# Patient Record
Sex: MALE | Race: BLACK OR AFRICAN AMERICAN | URBAN - METROPOLITAN AREA
[De-identification: names, ages, dates, MRNs, and addresses within clinical notes are randomized per-mention and may not be internally consistent; named-entity substitution may affect disease eponyms.]

---

## 2018-08-27 ENCOUNTER — OFFICE VISIT (OUTPATIENT)
Dept: INTERNAL MEDICINE CLINIC | Age: 68
End: 2018-08-27

## 2018-08-27 VITALS
TEMPERATURE: 98.7 F | HEART RATE: 92 BPM | OXYGEN SATURATION: 98 % | BODY MASS INDEX: 22.2 KG/M2 | RESPIRATION RATE: 18 BRPM | DIASTOLIC BLOOD PRESSURE: 83 MMHG | SYSTOLIC BLOOD PRESSURE: 117 MMHG | HEIGHT: 64 IN | WEIGHT: 130 LBS

## 2018-08-27 DIAGNOSIS — M54.2 CHRONIC NECK PAIN: ICD-10-CM

## 2018-08-27 DIAGNOSIS — J43.8 OTHER EMPHYSEMA (HCC): ICD-10-CM

## 2018-08-27 DIAGNOSIS — G89.29 CHRONIC NECK PAIN: ICD-10-CM

## 2018-08-27 DIAGNOSIS — M50.30 DEGENERATIVE DISC DISEASE, CERVICAL: ICD-10-CM

## 2018-08-27 DIAGNOSIS — K40.90 LEFT INGUINAL HERNIA: Primary | ICD-10-CM

## 2018-08-27 PROBLEM — E11.9 TYPE 2 DIABETES MELLITUS, WITHOUT LONG-TERM CURRENT USE OF INSULIN (HCC): Status: ACTIVE | Noted: 2018-08-27

## 2018-08-27 RX ORDER — FLUTICASONE PROPIONATE AND SALMETEROL 250; 50 UG/1; UG/1
1 POWDER RESPIRATORY (INHALATION) 2 TIMES DAILY
Qty: 1 INHALER | Refills: 5 | Status: SHIPPED | OUTPATIENT
Start: 2018-08-27 | End: 2018-12-02 | Stop reason: SDUPTHER

## 2018-08-27 RX ORDER — IPRATROPIUM BROMIDE AND ALBUTEROL SULFATE 2.5; .5 MG/3ML; MG/3ML
3 SOLUTION RESPIRATORY (INHALATION)
Qty: 30 NEBULE | Refills: 5 | Status: SHIPPED | OUTPATIENT
Start: 2018-08-27 | End: 2019-04-17 | Stop reason: SDUPTHER

## 2018-08-27 NOTE — PROGRESS NOTES
HISTORY OF PRESENT ILLNESS  Luke Anaya is a 78 y.o. male. HPI Mr. Mj Mistry is here to re-establish care and for concerned about LLQ pain for the past several months. He actually has been seen at the ER and was advised to follow up with a surgeon. He believes the hernia has increased in size and is uncomfortable. He has been out of several medications. 2018 June  CT ABD/PELVIS-IV ONLY6/5/2018  [facility]  Result Impression   IMPRESSION:    Small left inguinal hernia which contains nondilated small bowel loop. No bowel obstruction or bowel inflammation seen. A normal appendix. He also has been seeing pain management for chronic low back pain. He is also having neck pain and is supposed to have an MRI done. His sister is asking if I can just order the MRI of his neck b/c she has to take someone else to get an MRI and wants to schedule them at the same time. His pain management doctor is Dr. Charleen Vazquez. [facility] [address]  Result Impression   Impression:    1.  No acute bony abnormality.  No significant change from prior CT with moderately severe cervical spondylosis with bilateral areas of significant foraminal narrowing as described above. He has multiple other health issues - emphysema, DM2. His sister said his previous PCP retired. Advised him he needs to come in to address and treat his other medical conditions as soon as possible. Review of Systems   Constitutional: Negative. Eyes: Negative. Respiratory: Positive for cough, shortness of breath and wheezing. Cardiovascular: Negative for chest pain and leg swelling. Musculoskeletal: Positive for back pain and neck pain. Neurological: Negative. Physical Exam   Constitutional: He is oriented to person, place, and time. He appears well-developed and well-nourished. No distress. HENT:   Head: Normocephalic and atraumatic. Cardiovascular: Normal rate and regular rhythm. Pulmonary/Chest: He has wheezes. He has rales. Abdominal: A hernia is present. Hernia confirmed positive in the left inguinal area. Neurological: He is alert and oriented to person, place, and time. Visit Vitals    /83 (BP 1 Location: Left arm, BP Patient Position: Sitting)    Pulse 92    Temp 98.7 °F (37.1 °C) (Oral)    Resp 18    Ht 5' 4\" (1.626 m)    Wt 130 lb (59 kg)    SpO2 98%    BMI 22.31 kg/m2       ASSESSMENT and PLAN    ICD-10-CM ICD-9-CM    1. Left inguinal hernia K40.90 550.90 REFERRAL TO GENERAL SURGERY   2. Chronic neck pain M54.2 723.1 MRI CERV SPINE WO CONT    G89.29 338.29    3. Degenerative disc disease, cervical M50.30 722.4 MRI CERV SPINE WO CONT   4. Other emphysema ([facility] [address] J43.8 492.8 albuterol-ipratropium (DUO-NEB) 2.5 mg-0.5 mg/3 ml nebu      fluticasone-salmeterol (ADVAIR DISKUS) 250-50 mcg/dose diskus inhaler     Pt verbalized understanding of their condition and diagnoses, treatment plan,  as well as side effects of any new medications prescribed.

## 2018-08-27 NOTE — PROGRESS NOTES
Chief Complaint   Patient presents with    Abdominal Pain     knot on left side of abdomen     Neck Pain     pt seeing pain management who wants him to have an MRI before being seen or giving injections     Wheezing     and cough      1. Have you been to the ER, urgent care clinic since your last visit? Hospitalized since your last visit? No    2. Have you seen or consulted any other health care providers outside of the [address] [place] [address] since your last visit? Include any pap smears or colon screening.  No

## 2018-08-29 DIAGNOSIS — G89.29 CHRONIC NECK PAIN: ICD-10-CM

## 2018-08-29 DIAGNOSIS — M54.2 CHRONIC NECK PAIN: ICD-10-CM

## 2018-08-29 DIAGNOSIS — M50.30 DEGENERATIVE DISC DISEASE, CERVICAL: ICD-10-CM

## 2018-09-20 LAB — HBA1C MFR BLD HPLC: 5.9 %

## 2018-09-21 ENCOUNTER — OFFICE VISIT (OUTPATIENT)
Dept: INTERNAL MEDICINE CLINIC | Age: 68
End: 2018-09-21

## 2018-09-21 ENCOUNTER — HOSPITAL ENCOUNTER (OUTPATIENT)
Dept: LAB | Age: 68
Discharge: HOME | End: 2018-09-21
Payer: MEDICARE

## 2018-09-21 VITALS
OXYGEN SATURATION: 99 % | HEART RATE: 60 BPM | DIASTOLIC BLOOD PRESSURE: 80 MMHG | WEIGHT: 130 LBS | SYSTOLIC BLOOD PRESSURE: 122 MMHG | RESPIRATION RATE: 18 BRPM | BODY MASS INDEX: 22.2 KG/M2 | HEIGHT: 64 IN | TEMPERATURE: 98 F

## 2018-09-21 DIAGNOSIS — R82.998 LEUKOCYTES IN URINE: ICD-10-CM

## 2018-09-21 DIAGNOSIS — R94.31 ABNORMAL EKG: ICD-10-CM

## 2018-09-21 DIAGNOSIS — K40.90 LEFT INGUINAL HERNIA: Primary | ICD-10-CM

## 2018-09-21 DIAGNOSIS — E11.9 TYPE 2 DIABETES MELLITUS WITHOUT COMPLICATION, WITHOUT LONG-TERM CURRENT USE OF INSULIN (HCC): ICD-10-CM

## 2018-09-21 DIAGNOSIS — L85.3 DRY SKIN DERMATITIS: ICD-10-CM

## 2018-09-21 DIAGNOSIS — Z23 ENCOUNTER FOR IMMUNIZATION: ICD-10-CM

## 2018-09-21 DIAGNOSIS — Z01.818 PRE-OP EXAM: ICD-10-CM

## 2018-09-21 DIAGNOSIS — Z00.00 INITIAL MEDICARE ANNUAL WELLNESS VISIT: ICD-10-CM

## 2018-09-21 DIAGNOSIS — Z12.5 SCREENING FOR MALIGNANT NEOPLASM OF PROSTATE: ICD-10-CM

## 2018-09-21 PROCEDURE — 87491 CHLMYD TRACH DNA AMP PROBE: CPT | Performed by: PHYSICIAN ASSISTANT

## 2018-09-21 PROCEDURE — 87086 URINE CULTURE/COLONY COUNT: CPT | Performed by: PHYSICIAN ASSISTANT

## 2018-09-21 RX ORDER — AMMONIUM LACTATE 12 G/100G
LOTION TOPICAL
Qty: 1 BOTTLE | Refills: 3 | Status: SHIPPED | OUTPATIENT
Start: 2018-09-21

## 2018-09-21 NOTE — PROGRESS NOTES
Chief Complaint Patient presents with  Pre-op Exam  
 
1. Have you been to the ER, urgent care clinic since your last visit? Hospitalized since your last visit? No 
 
2. Have you seen or consulted any other health care providers outside of the [address] [place] [address] since your last visit? Include any pap smears or colon screening. No 
 
[facility] Oct 1 at 3:00pm 
[address] 
2[address] [facility] at 2:30pm  
[phone] ADL Assessment 9/21/2018 Feeding yourself No Help Needed Getting from bed to chair No Help Needed Getting dressed No Help Needed Bathing or showering No Help Needed Walk across the room (includes cane/walker) No Help Needed Using the telphone No Help Needed Taking your medications No Help Needed Preparing meals No Help Needed Managing money (expenses/bills) No Help Needed Moderately strenuous housework (laundry) No Help Needed Shopping for personal items (toiletries/medicines) No Help Needed Shopping for groceries No Help Needed Driving Help Needed Climbing a flight of stairs No Help Needed Getting to places beyond walking distances Help Needed ACP given to pt

## 2018-09-21 NOTE — PATIENT INSTRUCTIONS
[facility] Oct 1 at 3:00pm 
[address] 
2[address] [facility] Arrive at 2:30pm  
[phone]

## 2018-09-21 NOTE — MR AVS SNAPSHOT
[address] [place] [address] 84 [address] [id number] 
[phone] Patient: Carmen Christianson MRN: [id number] :1950 Visit Information Date & Time Provider Department Dept. Phone Encounter #  
 2018 11:30 AM Margot Stewart PA-C [facility] Higgins  Upcoming Health Maintenance Date Due  
 EYE EXAM RETINAL OR DILATED Q1 1961 FOBT Q 1 YEAR AGE 50-75 2001 GLAUCOMA SCREENING Q2Y 2016 Influenza Age 5 to Adult 3/31/2019* ZOSTER VACCINE AGE 60> 2019* Pneumococcal 65+ Low/Medium Risk (1 of 2 - PCV13) 2019* DTaP/Tdap/Td series (1 - Tdap) 2020* HEMOGLOBIN A1C Q6M 3/20/2019 FOOT EXAM Q1 2019 MICROALBUMIN Q1 2019 LIPID PANEL Q1 2019 MEDICARE YEARLY EXAM 2019 *Topic was postponed. The date shown is not the original due date. Allergies as of 2018  Review Complete On: 2018 By: Annalisa Arnold No Known Allergies Current Immunizations  Never Reviewed No immunizations on file. Not reviewed this visit You Were Diagnosed With   
  
 Codes Comments Leukocytes in urine    -  Primary ICD-10-CM: R82.99 
ICD-9-CM: 791.7 Encounter for immunization     ICD-10-CM: W29 ICD-9-CM: V03.89 Type 2 diabetes mellitus without complication, without long-term current use of insulin (HCC)     ICD-10-CM: E11.9 ICD-9-CM: 250.00 Screening for malignant neoplasm of prostate     ICD-10-CM: Z12.5 ICD-9-CM: V76.44 Vitals BP Pulse Temp Resp Height(growth percentile) Weight(growth percentile) 122/80 (BP 1 Location: Left arm, BP Patient Position: Sitting) 60 98 °F (36.7 °C) (Oral) 18 5' 4\" (1.626 m) 130 lb (59 kg) SpO2 BMI Smoking Status 99% 22.31 kg/m2 Current Every Day Smoker Vitals History BMI and BSA Data  Body Mass Index Body Surface Area  
 22.31 kg/m 2 1.63 m 2  
  
  
 Preferred Pharmacy Pharmacy Name Phone [phone] Your Updated Medication List  
  
   
This list is accurate as of 9/21/18 11:58 AM.  Always use your most recent med list.  
  
  
  
  
 * VENTOLIN HFA 90 mcg/actuation inhaler Generic drug:  albuterol INHALE 1-2 PUFFS BY MOUTH EVERY 4 HOURS AS NEEDED FOR WHEEZING OR SHORTNESS OF BREATH  
  
 * albuterol 2.5 mg /3 mL (0.083 %) nebulizer solution Commonly known as:  PROVENTIL VENTOLIN  
INHALE 1 VIAL (3ML) VIA NEBULIZER TWICE DAILY  
  
 albuterol-ipratropium 2.5 mg-0.5 mg/3 ml Nebu Commonly known as:  DUO-NEB  
3 mL by Nebulization route every four (4) hours as needed. fluticasone-salmeterol 250-50 mcg/dose diskus inhaler Commonly known as:  ADVAIR DISKUS Take 1 Puff by inhalation two (2) times a day.  
  
 gabapentin 100 mg capsule Commonly known as:  NEURONTIN  
100 mg.  
  
 metFORMIN 1,000 mg tablet Commonly known as:  GLUCOPHAGE  
TAKE 1 TABLET BY MOUTH TWICE DAILY  
  
 ONETOUCH ULTRA TEST strip Generic drug:  glucose blood VI test strips 50 Each.  
  
 simvastatin 20 mg tablet Commonly known as:  ZOCOR  
TAKE 1 TABLET BY MOUTH ONCE DAILY * Notice: This list has 2 medication(s) that are the same as other medications prescribed for you. Read the directions carefully, and ask your doctor or other care provider to review them with you. We Performed the Following AMB EXT HGBA1C [AQH08817 CPT(R)] Comments: This external order was created through the Results Console. Patient Instructions [facility] Oct 1 at 3:00pm 
[address] 
[address] [facility] at 2:30pm  
[phone] Introducing [facility] & HEALTH SERVICES!    
 [employer] introduces [employer] patient portal. Now you can access parts of your medical record, email your doctor's office, and request medication refills online. 1. In your internet browser, go to [email or web address] 2. Click on the First Time User? Click Here link in the Sign In box. You will see the New Member Sign Up page. 3. Enter your [employer] Access Code exactly as it appears below. You will not need to use this code after youve completed the sign-up process. If you do not sign up before the expiration date, you must request a new code. · [employer] Access Code: [email or web address] Expires: 11/26/2018  2:45 PM 
 
4. Enter the last four digits of your Social Security Number (xxxx) and Date of Birth (mm/dd/yyyy) as indicated and click Submit. You will be taken to the next sign-up page. 5. Create a [employer] ID. This will be your [employer] login ID and cannot be changed, so think of one that is secure and easy to remember. 6. Create a [employer] password. You can change your password at any time. 7. Enter your Password Reset Question and Answer. This can be used at a later time if you forget your password. 8. Enter your e-mail address. You will receive e-mail notification when new information is available in [address] 9. Click Sign Up. You can now view and download portions of your medical record. 10. Click the Download Summary menu link to download a portable copy of your medical information. If you have questions, please visit the Frequently Asked Questions section of the [employer] website. Remember, [employer] is NOT to be used for urgent needs. For medical emergencies, dial 911. Now available from your iPhone and Android! Please provide this summary of care documentation to your next provider. If you have any questions after today's visit, please call [phone].

## 2018-09-24 ENCOUNTER — TELEPHONE (OUTPATIENT)
Dept: INTERNAL MEDICINE CLINIC | Age: 68
End: 2018-09-24

## 2018-09-24 DIAGNOSIS — R79.89 LOW TSH LEVEL: Primary | ICD-10-CM

## 2018-09-24 LAB
CHOLEST SERPL-MCNC: 196 MG/DL (ref 100–199)
CREATININE(CRT),U, 723280: 200 MG/DL
HDLC SERPL-MCNC: 64 MG/DL
LDLC SERPL CALC-MCNC: 122 MG/DL (ref 0–99)
MICROALBUMIN UR TEST STR-MCNC: 30 MG/L
MICROALBUMIN/CREAT RATIO POC: <30 MG/G
PSA SERPL-MCNC: 3.3 NG/ML (ref 0–4)
TRIGL SERPL-MCNC: 51 MG/DL (ref 0–149)
TSH SERPL DL<=0.005 MIU/L-ACNC: 0.12 UIU/ML (ref 0.45–4.5)
VLDLC SERPL CALC-MCNC: 10 MG/DL (ref 5–40)

## 2018-09-24 NOTE — TELEPHONE ENCOUNTER
His TSH is low again. I am going to refer him to endocrinology. His bad cholesterol is a little high. Make sure he is taking simvastatin daily and if it remains elevated, I will increase the dose.

## 2018-09-26 LAB
BACTERIA SPEC CULT: NORMAL
C TRACH RRNA VAG QL NAA+PROBE: NEGATIVE
N GONORRHOEA RRNA VAG QL NAA+PROBE: NEGATIVE
SERVICE CMNT-IMP: NORMAL
SPECIMEN SOURCE: ABNORMAL
T VAGINALIS RRNA VAG QL NAA+PROBE: POSITIVE

## 2018-09-26 NOTE — PROGRESS NOTES
Ambreen Martinez is a 78 y.o. male and presents for annual Medicare Wellness Visit. Problem List: Reviewed with patient and discussed risk factors. Patient Active Problem List  
Diagnosis Code  Left inguinal hernia K40.90  Type 2 diabetes mellitus, without long-term current use of insulin (HCC) E11.9  Chronic neck pain M54.2, G89.29  
 Other emphysema (HCC) J43.8  Low TSH level R94.6 Current medical providers:  No care team member to display PSH: Reviewed with patient History reviewed. No pertinent surgical history. SH: Reviewed with patient Social History Substance Use Topics  Smoking status: Current Every Day Smoker Packs/day: 0.25 Years: 40.00  Smokeless tobacco: Never Used  Alcohol use No  
 
 
FH: Reviewed with patient Family History Problem Relation Age of Onset  Diabetes Mother  Diabetes Brother Medications/Allergies: Reviewed with patient Current Outpatient Prescriptions on File Prior to Visit Medication Sig Dispense Refill  albuterol-ipratropium (DUO-NEB) 2.5 mg-0.5 mg/3 ml nebu 3 mL by Nebulization route every four (4) hours as needed. 30 Nebule 5  
 fluticasone-salmeterol (ADVAIR DISKUS) 250-50 mcg/dose diskus inhaler Take 1 Puff by inhalation two (2) times a day. 1 Inhaler 5  
 albuterol (PROVENTIL VENTOLIN) 2.5 mg /3 mL (0.083 %) nebulizer solution INHALE 1 VIAL (3ML) VIA NEBULIZER TWICE DAILY  2  
 VENTOLIN HFA 90 mcg/actuation inhaler INHALE 1-2 PUFFS BY MOUTH EVERY 4 HOURS AS NEEDED FOR WHEEZING OR SHORTNESS OF BREATH  0  
 metFORMIN (GLUCOPHAGE) 1,000 mg tablet TAKE 1 TABLET BY MOUTH TWICE DAILY  5  
 simvastatin (ZOCOR) 20 mg tablet TAKE 1 TABLET BY MOUTH ONCE DAILY  5  
 glucose blood VI test strips (ONETOUCH ULTRA TEST) strip 50 Each.  gabapentin (NEURONTIN) 100 mg capsule 100 mg. No current facility-administered medications on file prior to visit. No Known Allergies Objective: 
Visit Vitals  /80 (BP 1 Location: Left arm, BP Patient Position: Sitting)  Pulse 60  Temp 98 °F (36.7 °C) (Oral)  Resp 18  Ht 5' 4\" (1.626 m)  Wt 130 lb (59 kg)  SpO2 99%  BMI 22.31 kg/m2 Body mass index is 22.31 kg/(m^2). Assessment of cognitive impairment: Alert and oriented x 3 Depression Screen: PHQ over the last two weeks 9/21/2018 Little interest or pleasure in doing things Not at all Feeling down, depressed, irritable, or hopeless Not at all Total Score PHQ 2 0 Fall Risk Assessment:  No flowsheet data found. Functional Ability:  
Does the patient exhibit a steady gait? yes How long did it take the patient to get up and walk from a sitting position? <10 seconds Is the patient self reliant?  (ie can do own laundry, meals, household chores)  Yes - aside from driving Does the patient handle his/her own medications? yes Does the patient handle his/her own money? yes Is the patients home safe (ie good lighting, handrails on stairs and bath, etc.)? yes Did you notice or did patient express any hearing difficulties? no 
  
Did you notice or did patient express any vision difficulties?   no 
  
Were distance and reading eye charts used? yes Advance Care Planning:  
Patient was offered the opportunity to discuss advance care planning:  yes Does patient have an Advance Directive:  no If no, did you provide information on Caring Connections? yes Plan:   
 
Orders Placed This Encounter  CULTURE, URINE  AMB EXT HGBA1C  
 LIPID PANEL  
 TSH 3RD GENERATION  
 PSA SCREENING (SCREENING)  CT/NG/T.VAGINALIS AMPLIFICATION  
 LIPID PANEL  
 TSH 3RD GENERATION  
 PSA SCREENING (SCREENING)  REFERRAL TO OPHTHALMOLOGY  REFERRAL TO PODIATRY  AMB POC URINE, MICROALBUMIN, SEMIQUANTITATIVE  
 HM DIABETES FOOT EXAM  
 ammonium lactate (LAC-HYDRIN) 12 % lotion Health Maintenance Topic Date Due  
  EYE EXAM RETINAL OR DILATED Q1  12/28/1961  Shingrix Vaccine Age 50> (1 of 2) 12/28/2001  
 FOBT Q 1 YEAR AGE 50-75  12/28/2001  GLAUCOMA SCREENING Q2Y  12/28/2016  Influenza Age 5 to Adult  03/31/2019 (Originally 8/1/2018)  Pneumococcal 65+ Low/Medium Risk (1 of 2 - PCV13) 09/21/2019 (Originally 12/28/2016)  DTaP/Tdap/Td series (1 - Tdap) 01/01/2020 (Originally 12/28/1972)  HEMOGLOBIN A1C Q6M  03/20/2019  
 FOOT EXAM Q1  09/21/2019  LIPID PANEL Q1  09/21/2019  MEDICARE YEARLY EXAM  09/22/2019  MICROALBUMIN Q1  09/24/2019  Hepatitis C Screening  Completed *Patient verbalized understanding and agreement with the plan. A copy of the After Visit Summary with personalized health plan was given to the patient today.

## 2018-09-27 ENCOUNTER — TELEPHONE (OUTPATIENT)
Dept: INTERNAL MEDICINE CLINIC | Age: 68
End: 2018-09-27

## 2018-09-27 NOTE — TELEPHONE ENCOUNTER
His urine was positive for trichomonas. He did not have gonorrhea, chlamydia or other infection. He may not have sx of trichomonas, but if he is sexually active he could pass it on to a partner in which case I would advise treating it.

## 2018-09-28 RX ORDER — METRONIDAZOLE 500 MG/1
500 TABLET ORAL 2 TIMES DAILY
Qty: 14 TAB | Refills: 0 | Status: SHIPPED | OUTPATIENT
Start: 2018-09-28 | End: 2018-10-05

## 2018-10-05 ENCOUNTER — TELEPHONE (OUTPATIENT)
Dept: INTERNAL MEDICINE CLINIC | Age: 68
End: 2018-10-05

## 2018-10-05 DIAGNOSIS — M48.02 CERVICAL STENOSIS OF SPINE: Primary | ICD-10-CM

## 2018-10-05 NOTE — TELEPHONE ENCOUNTER
He did say that his pain management doctor had wanted him to have this MRI, but requested that I order it. He will definitely want to review it with her. However, it does show moderate to severe changes at multiple levels. I don't know if he has been evaluated by a neurosurgeon, but I believe his pain specialists at [facility] do have neurosurgeons in the office and I can refer him to one if he wants.

## 2018-11-07 ENCOUNTER — PATIENT OUTREACH (OUTPATIENT)
Dept: FAMILY MEDICINE CLINIC | Age: 68
End: 2018-11-07

## 2018-11-07 PROBLEM — R06.00 DYSPNEA: Status: ACTIVE | Noted: 2018-11-04

## 2018-11-07 PROBLEM — G89.29 CHRONIC MYOFASCIAL PAIN: Status: ACTIVE | Noted: 2018-11-04

## 2018-11-07 PROBLEM — M54.41 CHRONIC BILATERAL LOW BACK PAIN WITH BILATERAL SCIATICA: Status: ACTIVE | Noted: 2018-11-04

## 2018-11-07 PROBLEM — M79.18 CHRONIC MYOFASCIAL PAIN: Status: ACTIVE | Noted: 2018-11-04

## 2018-11-07 PROBLEM — M79.2 NEUROPATHIC PAIN: Status: ACTIVE | Noted: 2018-11-04

## 2018-11-07 PROBLEM — M1A.09X0 CHRONIC GOUT OF MULTIPLE SITES: Status: ACTIVE | Noted: 2018-11-04

## 2018-11-07 PROBLEM — G89.29 CHRONIC BILATERAL LOW BACK PAIN WITH BILATERAL SCIATICA: Status: ACTIVE | Noted: 2018-11-04

## 2018-11-07 PROBLEM — M47.812 CERVICAL SPONDYLOSIS WITHOUT MYELOPATHY: Status: ACTIVE | Noted: 2018-11-04

## 2018-11-07 PROBLEM — R09.02 HYPOXIA: Status: ACTIVE | Noted: 2018-11-04

## 2018-11-07 PROBLEM — E78.5 HLD (HYPERLIPIDEMIA): Status: ACTIVE | Noted: 2018-11-04

## 2018-11-07 PROBLEM — M54.42 CHRONIC BILATERAL LOW BACK PAIN WITH BILATERAL SCIATICA: Status: ACTIVE | Noted: 2018-11-04

## 2018-11-07 RX ORDER — AZITHROMYCIN 500 MG/1
500 TABLET, FILM COATED ORAL DAILY
COMMUNITY
Start: 2018-11-06 | End: 2018-11-10

## 2018-11-07 NOTE — PROGRESS NOTES
[facility] Discharge Follow-Up Date/Time:  2018 1:36 PM 
 
Patient was admitted to [facility] on  
2018 and discharged on 2018 for *SOB The physician discharge summary was ready available at the time of outreach. Patient was contacted within 2 business days of discharge. His ex-wife Theo Quigley's she takes care of his appointments and gave NN [phone] to contact patient, but no answer Top Challenges reviewed with the provider Summary of events: 
Etiology for his symptoms was unclear. Per daughter, this has been a recurrent issue and typically resolves just as quickly as symptoms occurred. Patient felt he was back to his baseline. Imaging and labs were unrevealing. Aside from mildly elevated ESR of 23, CT head, CPK, PAT profile, aldolase unrevealing. UDS was positive for cocaine, however, so possibly this was somehow a factor? Consider outpatient neurology evaluation and EMG/NCS. AECOPD responded well and rapidly even with oral regimen. Will finish course of steroids and azithromycin. Abnormal TFTs noted (TSH 0.06 and FT3 2.2). Discussed with endocrinology on call who advised this is likely sick euthyroid and to repeat 4-6 weeks then refer to outpatient endo if still abnormal. CT not suggestive of mass to be c/w central process --> solumedrol given in ER may also have suppressed thyroid hormone conversion. Details as per last progress note. 2. Patient is being admitted for planned hernia repair on 2018 Method of communication with provider :chart routing Inpatient RRAT score: 15 Was this a readmission? no  
Patient stated reason for the readmission: n/a Nurse Navigator (NN) contacted the family by telephone to perform post hospital discharge assessment. Verified name and  with family as identifiers. Provided introduction to self, and explanation of the Nurse Navigator role. Reviewed discharge instructions and red flags with family who verbalized understanding. Patient given an opportunity to ask questions and does not have any further questions or concerns at this time. The family agrees to contact the PCP office for questions related to their healthcare. NN provided contact information for future reference. Disease Specific:   N/A Summary of patient's top problems: 1. UDS (+) Cocaine 2. Planned surgery umbilical Hernia repair [id number]/17/9361 Home Health orders at discharge: None Home Health company: n/A Date of initial visit: N/a Barriers to care? (+) UDS for Cocaine use Advance Care Planning:  
Does patient have an Advance Directive:  not on file Medication(s):  
New Medications at Discharge: None thing New Changed Medications at Discharge: N/A Discontinued Medications at Discharge: N/A Medication reconciliation was performed with family, who verbalizes understanding of administration of home medications. There were no barriers to obtaining medications identified at this time. Referral to Pharm D needed: no  
 
Current Outpatient Medications Medication Sig  
 ammonium lactate (LAC-HYDRIN) 12 % lotion rub in to affected area well 4-5 times per day  albuterol-ipratropium (DUO-NEB) 2.5 mg-0.5 mg/3 ml nebu 3 mL by Nebulization route every four (4) hours as needed.  fluticasone-salmeterol (ADVAIR DISKUS) 250-50 mcg/dose diskus inhaler Take 1 Puff by inhalation two (2) times a day.  albuterol (PROVENTIL VENTOLIN) 2.5 mg /3 mL (0.083 %) nebulizer solution INHALE 1 VIAL (3ML) VIA NEBULIZER TWICE DAILY  VENTOLIN HFA 90 mcg/actuation inhaler INHALE 1-2 PUFFS BY MOUTH EVERY 4 HOURS AS NEEDED FOR WHEEZING OR SHORTNESS OF BREATH  metFORMIN (GLUCOPHAGE) 1,000 mg tablet TAKE 1 TABLET BY MOUTH TWICE DAILY  simvastatin (ZOCOR) 20 mg tablet TAKE 1 TABLET BY MOUTH ONCE DAILY  glucose blood VI test strips (ONETOUCH ULTRA TEST) strip 50 Each.  gabapentin (NEURONTIN) 100 mg capsule 100 mg. No current facility-administered medications for this visit. There are no discontinued medications. [facility] follow up appointment(s): No future appointments. Non-[facility] follow up appointment(s): n/a formerly [facility]:  n/a Simon Morillo Addressed This Visit's Progress Post Hospitalization  Attends follow-up appointments as directed.  Knowledge and adherence of prescribed medication (ie. action, side effects, missed dose, etc.).  Prevent complications post hospitalization. Planned admission for hernia surgery 11/13/2018  Returns to baseline activity level.

## 2018-11-07 NOTE — Clinical Note
This patient was d/c from [facility] 11/4-5/2018. Will be going back for planned surgery Hernia repair 11/13/2018 He tested (+) Cocaine UDS. Spoke to his ex wife could not get a hold of him to do a VON will continue to monitor. He should need a pre-op clearance prior to his up coming surgery

## 2018-11-21 ENCOUNTER — OFFICE VISIT (OUTPATIENT)
Dept: INTERNAL MEDICINE CLINIC | Age: 68
End: 2018-11-21

## 2018-11-21 VITALS
DIASTOLIC BLOOD PRESSURE: 74 MMHG | BODY MASS INDEX: 22.2 KG/M2 | HEIGHT: 64 IN | WEIGHT: 130 LBS | RESPIRATION RATE: 18 BRPM | TEMPERATURE: 98.8 F | SYSTOLIC BLOOD PRESSURE: 113 MMHG | OXYGEN SATURATION: 97 % | HEART RATE: 94 BPM

## 2018-11-21 DIAGNOSIS — M10.9 GOUT OF MULTIPLE SITES, UNSPECIFIED CAUSE, UNSPECIFIED CHRONICITY: ICD-10-CM

## 2018-11-21 DIAGNOSIS — R52 PAIN OF MULTIPLE SITES: ICD-10-CM

## 2018-11-21 DIAGNOSIS — J44.9 CHRONIC OBSTRUCTIVE PULMONARY DISEASE, UNSPECIFIED COPD TYPE (HCC): ICD-10-CM

## 2018-11-21 DIAGNOSIS — E11.9 TYPE 2 DIABETES MELLITUS WITHOUT COMPLICATION, WITHOUT LONG-TERM CURRENT USE OF INSULIN (HCC): Primary | ICD-10-CM

## 2018-11-21 RX ORDER — ALLOPURINOL 100 MG/1
100 TABLET ORAL DAILY
Qty: 90 TAB | Refills: 1 | Status: SHIPPED | OUTPATIENT
Start: 2018-11-21 | End: 2019-04-17 | Stop reason: SDUPTHER

## 2018-11-21 RX ORDER — SULINDAC 200 MG/1
200 TABLET ORAL 2 TIMES DAILY
Qty: 60 TAB | Refills: 2 | Status: SHIPPED | OUTPATIENT
Start: 2018-11-21

## 2018-11-21 RX ORDER — INDOMETHACIN 25 MG/1
CAPSULE ORAL 3 TIMES DAILY
COMMUNITY
End: 2018-11-21 | Stop reason: ALTCHOICE

## 2018-11-21 RX ORDER — BLOOD-GLUCOSE METER
EACH MISCELLANEOUS
Qty: 100 EACH | Refills: 11 | Status: SHIPPED | OUTPATIENT
Start: 2018-11-21

## 2018-11-21 RX ORDER — BLOOD-GLUCOSE METER
EACH MISCELLANEOUS
Qty: 1 EACH | Refills: 0 | Status: SHIPPED | OUTPATIENT
Start: 2018-11-21 | End: 2018-11-21 | Stop reason: CLARIF

## 2018-11-21 RX ORDER — LANCETS
EACH MISCELLANEOUS
Qty: 100 EACH | Refills: 11 | Status: SHIPPED | OUTPATIENT
Start: 2018-11-21

## 2018-11-21 RX ORDER — TOPIRAMATE 25 MG/1
TABLET ORAL
COMMUNITY
Start: 2018-11-20

## 2018-11-21 RX ORDER — INDOMETHACIN 25 MG/1
25 CAPSULE ORAL 3 TIMES DAILY
Qty: 90 CAP | Refills: 1 | Status: CANCELLED | OUTPATIENT
Start: 2018-11-21

## 2018-11-21 RX ORDER — LANCETS
EACH MISCELLANEOUS
Qty: 100 EACH | Refills: 11 | Status: SHIPPED | OUTPATIENT
Start: 2018-11-21 | End: 2018-11-21 | Stop reason: CLARIF

## 2018-11-21 RX ORDER — DOCUSATE SODIUM 100 MG/1
CAPSULE, LIQUID FILLED ORAL
Refills: 0 | COMMUNITY
Start: 2018-11-13

## 2018-11-21 NOTE — PROGRESS NOTES
Chief Complaint   Patient presents with    Gout     1. Have you been to the ER, urgent care clinic since your last visit? Hospitalized since your last visit? No    2. Have you seen or consulted any other health care providers outside of the [address] [place] [address] since your last visit? Include any pap smears or colon screening.  No

## 2018-11-21 NOTE — PROGRESS NOTES
HISTORY OF PRESENT ILLNESS  Alessandra Montgomery is a 78 y.o. male. HPI Mr. Swati Monzon is here for follow up on pain associated with gout. He states several days ago his shoulder joint and left arm was hurting. He states he also has intermittent swelling in his hands associated with painful joints. He was prescribed indocin from the ER or during a hospital admission. However, his med list also shows that he is given clinoril 200mg, I think prescribed by his pain management doctor. He also is requesting a refill on Spiriva. He had been on Advair in the past, but does not have this in his bag of medications. He should have refills left according to the prescription date. He continues to c/o dyspnea. Has known COPD/emphysema. Advised him and his daughter I am going to refer him to pulmonology. Review of Systems   Constitutional: Negative. Respiratory: Positive for shortness of breath. Cardiovascular: Negative for chest pain and leg swelling. Gastrointestinal: Negative for heartburn. Musculoskeletal: Positive for back pain, joint pain (left shoulder, hand) and neck pain. Physical Exam   Constitutional: He is oriented to person, place, and time. He appears well-developed and well-nourished. No distress. HENT:   Head: Normocephalic and atraumatic. Cardiovascular: Regular rhythm. Pulmonary/Chest: He has decreased breath sounds. Musculoskeletal: He exhibits no edema.   + left shoulder pain. Hands with arthritic changes. Neurological: He is alert and oriented to person, place, and time. Visit Vitals  /74 (BP 1 Location: Left arm, BP Patient Position: Sitting)   Pulse 94   Temp 98.8 °F (37.1 °C) (Oral)   Resp 18   Ht 5' 4\" (1.626 m)   Wt 130 lb (59 kg)   SpO2 97%   BMI 22.31 kg/m²       ASSESSMENT and PLAN    ICD-10-CM ICD-9-CM    1.  Type 2 diabetes mellitus without complication, without long-term current use of insulin ([facility]) E11.9 250.00 SITagliptin (JANUVIA) 100 mg tablet Blood-Glucose Meter (ACCU-CHEK HUMBERTO PLUS METER) misc      glucose blood VI test strips (ACCU-CHEK HUMBERTO PLUS TEST STRP) strip      Lancets (ACCU-CHEK MULTICLIX LANCET) misc      DISCONTINUED: Blood-Glucose Meter (ONETOUCH VERIO FLEX) misc      DISCONTINUED: glucose blood VI test strips (ONETOUCH VERIO) strip      DISCONTINUED: Lancets (ONETOUCH ULTRASOFT LANCETS) misc   2. Chronic obstructive pulmonary disease, unspecified [facility] ([facility] J44.9 496 REFERRAL TO PULMONARY DISEASE   3. Gout of multiple sites, unspecified cause, unspecified chronicity M10.9 274.9 allopurinol (ZYLOPRIM) 100 mg tablet      sulindac (CLINORIL) 200 mg tablet   4. Pain of multiple sites R52 780.96 sulindac (CLINORIL) 200 mg tablet     Advised again I don't see any reason for clinoril and indocin. Since he has been prescribed clinoril, will refill this, but he should get his medications that were prescribed by someone else from them again.

## 2018-11-29 ENCOUNTER — PATIENT OUTREACH (OUTPATIENT)
Dept: FAMILY MEDICINE CLINIC | Age: 68
End: 2018-11-29

## 2018-12-02 DIAGNOSIS — J43.8 OTHER EMPHYSEMA (HCC): ICD-10-CM

## 2018-12-03 RX ORDER — FLUTICASONE PROPIONATE AND SALMETEROL 50; 250 UG/1; UG/1
POWDER RESPIRATORY (INHALATION)
Qty: 1 INHALER | Refills: 5 | Status: SHIPPED | OUTPATIENT
Start: 2018-12-03 | End: 2019-04-17 | Stop reason: SDUPTHER

## 2019-04-04 RX ORDER — TIOTROPIUM BROMIDE 18 UG/1
CAPSULE ORAL; RESPIRATORY (INHALATION)
Qty: 1 CAP | Refills: 0 | Status: SHIPPED | OUTPATIENT
Start: 2019-04-04 | End: 2019-04-17 | Stop reason: SDUPTHER

## 2019-04-17 ENCOUNTER — OFFICE VISIT (OUTPATIENT)
Dept: INTERNAL MEDICINE CLINIC | Age: 69
End: 2019-04-17

## 2019-04-17 VITALS
WEIGHT: 127 LBS | HEIGHT: 64 IN | OXYGEN SATURATION: 95 % | RESPIRATION RATE: 18 BRPM | BODY MASS INDEX: 21.68 KG/M2 | TEMPERATURE: 97.6 F | DIASTOLIC BLOOD PRESSURE: 75 MMHG | SYSTOLIC BLOOD PRESSURE: 112 MMHG | HEART RATE: 80 BPM

## 2019-04-17 DIAGNOSIS — N52.9 ERECTILE DYSFUNCTION, UNSPECIFIED ERECTILE DYSFUNCTION TYPE: ICD-10-CM

## 2019-04-17 DIAGNOSIS — R06.00 DYSPNEA, UNSPECIFIED TYPE: ICD-10-CM

## 2019-04-17 DIAGNOSIS — Z88.9 H/O SEASONAL ALLERGIES: ICD-10-CM

## 2019-04-17 DIAGNOSIS — J43.8 OTHER EMPHYSEMA (HCC): ICD-10-CM

## 2019-04-17 DIAGNOSIS — E11.40 TYPE 2 DIABETES MELLITUS WITH DIABETIC NEUROPATHY, WITHOUT LONG-TERM CURRENT USE OF INSULIN (HCC): Primary | ICD-10-CM

## 2019-04-17 DIAGNOSIS — R05.9 COUGH: ICD-10-CM

## 2019-04-17 DIAGNOSIS — M10.9 GOUT OF MULTIPLE SITES, UNSPECIFIED CAUSE, UNSPECIFIED CHRONICITY: ICD-10-CM

## 2019-04-17 RX ORDER — FLUTICASONE PROPIONATE AND SALMETEROL 250; 50 UG/1; UG/1
POWDER RESPIRATORY (INHALATION)
Qty: 1 INHALER | Refills: 5 | Status: SHIPPED | OUTPATIENT
Start: 2019-04-17 | End: 2019-11-12

## 2019-04-17 RX ORDER — ALLOPURINOL 100 MG/1
100 TABLET ORAL DAILY
Qty: 90 TAB | Refills: 1 | Status: SHIPPED | OUTPATIENT
Start: 2019-04-17 | End: 2019-11-27 | Stop reason: SDUPTHER

## 2019-04-17 RX ORDER — METFORMIN HYDROCHLORIDE 1000 MG/1
TABLET ORAL
Qty: 60 TAB | Refills: 5 | Status: SHIPPED | OUTPATIENT
Start: 2019-04-17 | End: 2019-05-24 | Stop reason: DRUGHIGH

## 2019-04-17 RX ORDER — LEVALBUTEROL INHALATION SOLUTION 1.25 MG/3ML
1.25 SOLUTION RESPIRATORY (INHALATION)
Qty: 3 ML | Refills: 0 | Status: SHIPPED | OUTPATIENT
Start: 2019-04-17 | End: 2019-04-17

## 2019-04-17 RX ORDER — ALBUTEROL SULFATE 90 UG/1
AEROSOL, METERED RESPIRATORY (INHALATION)
Qty: 1 INHALER | Refills: 0 | Status: SHIPPED | OUTPATIENT
Start: 2019-04-17 | End: 2019-10-02 | Stop reason: SDUPTHER

## 2019-04-17 RX ORDER — SILDENAFIL 100 MG/1
TABLET, FILM COATED ORAL
Qty: 6 TAB | Refills: 3 | Status: SHIPPED | OUTPATIENT
Start: 2019-04-17 | End: 2019-05-24 | Stop reason: SDUPTHER

## 2019-04-17 RX ORDER — IPRATROPIUM BROMIDE AND ALBUTEROL SULFATE 2.5; .5 MG/3ML; MG/3ML
3 SOLUTION RESPIRATORY (INHALATION)
Qty: 24 NEBULE | Refills: 5 | Status: SHIPPED | OUTPATIENT
Start: 2019-04-17 | End: 2019-07-01 | Stop reason: SDUPTHER

## 2019-04-17 RX ORDER — LORATADINE 10 MG/1
10 TABLET ORAL DAILY
Qty: 90 TAB | Refills: 1 | Status: SHIPPED | OUTPATIENT
Start: 2019-04-17 | End: 2019-11-12

## 2019-04-17 RX ORDER — METHYLPREDNISOLONE 4 MG/1
TABLET ORAL
Qty: 1 DOSE PACK | Refills: 0 | Status: SHIPPED | OUTPATIENT
Start: 2019-04-17 | End: 2019-07-01

## 2019-04-17 NOTE — PATIENT INSTRUCTIONS
[facility]  Dr. Gisel Geiger, 1 [facility], [address]  Phone: [phone]

## 2019-04-17 NOTE — PROGRESS NOTES
HISTORY OF PRESENT ILLNESS  Flora Osorio is a 80 y.o. male. HPI  Mr. Alanna Strange is here for c/o shortness of breath and cough. He had some wheezing yesterday. He states he is out of inhalers and nebulizer medication. He should have some left according to the date I gave him the prescriptions. He has not checked with the pharmacy for refills. He did not go to pulmonology. I advised him that he needs to go to the specialists who I refer him to in order to get the proper management of his conditions. Review of Systems   Constitutional: Negative for chills and fever. HENT: Negative for sore throat. Respiratory: Positive for cough and shortness of breath. Cardiovascular: Negative for chest pain and leg swelling. Gastrointestinal: Negative. Musculoskeletal: Positive for joint pain (right arm, hand. He relates it to gout. He admits he missed some pain management appointments). Psychiatric/Behavioral: Negative. Physical Exam   Constitutional: He is oriented to person, place, and time. He appears well-developed and well-nourished. No distress. HENT:   Head: Normocephalic and atraumatic. Cardiovascular: Normal rate. Pulmonary/Chest: Effort normal. He has decreased breath sounds. He has no wheezes. Musculoskeletal: He exhibits no edema. Cervical back: He exhibits pain. Lumbar back: He exhibits pain. Bilateral hand pain   Neurological: He is alert and oriented to person, place, and time. Visit Vitals  /75 (BP 1 Location: Left arm, BP Patient Position: Sitting)   Pulse 80   Temp 97.6 °F (36.4 °C) (Oral)   Resp 18   Ht 5' 4\" (1.626 m)   Wt 127 lb (57.6 kg)   SpO2 95%   BMI 21.80 kg/m²     Will administer nebulizer treatment here since he does feel that helps. He also feels that steroids help him. I will also have him start claritin. He is bothered by seasonal allergies. At the end of the visit he requested to try viagra.  I advised him that it may not be covered by his insurance, but I can prescribe it to him. ASSESSMENT and PLAN    ICD-10-CM ICD-9-CM    1. Type 2 diabetes mellitus with diabetic neuropathy, without long-term current use of insulin ([facility]) K42.50 215.44 METABOLIC PANEL, COMPREHENSIVE     357.2 LIPID PANEL      HEMOGLOBIN A1C WITH EAG   2. Gout of multiple sites, unspecified cause, unspecified chronicity M10.9 274.9 allopurinol (ZYLOPRIM) 100 mg tablet   3. Other emphysema ([facility]) J43.8 492.8 albuterol-ipratropium (DUO-NEB) 2.5 mg-0.5 mg/3 ml nebu      fluticasone propion-salmeterol (ADVAIR DISKUS) 250-50 mcg/dose diskus inhaler      methylPREDNISolone (MEDROL, LADONNA,) 4 mg tablet   4. Cough R05 786.2 XR CHEST PA LAT      methylPREDNISolone (MEDROL, LADONNA,) 4 mg tablet      LEVALBUTEROL, INHAL. SOL., FDA-APPROVED FINAL, NON-COMPOUND UNIT DOSE, 0.5 MG      levalbuterol (XOPENEX) 1.25 mg/3 mL nebu      MI PRESSURIZED/NONPRESSURIZED INHALATION TREATMENT   5. Dyspnea, unspecified type R06.00 786.09 XR CHEST PA LAT      methylPREDNISolone (MEDROL, LADONNA,) 4 mg tablet   6. H/O seasonal allergies Z88.9 V15.09 loratadine (CLARITIN) 10 mg tablet   7. Erectile dysfunction, unspecified erectile dysfunction type N52.9 607.84 sildenafil citrate (VIAGRA) 100 mg tablet   Will contact pt after final radiology report is available. Advised him and his daughter that he needs to follow up with pulmonology. Pt verbalized understanding of their condition and diagnoses, treatment plan,  as well as side effects of any new medications prescribed.

## 2019-04-17 NOTE — PROGRESS NOTES
Chief Complaint   Patient presents with    Diabetes    Gout    COPD     1. Have you been to the ER, urgent care clinic since your last visit? Hospitalized since your last visit? No    2. Have you seen or consulted any other health care providers outside of the [address] [place] [address] since your last visit? Include any pap smears or colon screening.  No

## 2019-04-18 ENCOUNTER — TELEPHONE (OUTPATIENT)
Dept: INTERNAL MEDICINE CLINIC | Age: 69
End: 2019-04-18

## 2019-04-18 LAB
ALBUMIN SERPL-MCNC: 3.9 G/DL (ref 3.6–4.8)
ALBUMIN/GLOB SERPL: 1.3 {RATIO} (ref 1.2–2.2)
ALP SERPL-CCNC: 49 IU/L (ref 39–117)
ALT SERPL-CCNC: 27 IU/L (ref 0–44)
AST SERPL-CCNC: 23 IU/L (ref 0–40)
BILIRUB SERPL-MCNC: 0.5 MG/DL (ref 0–1.2)
BUN SERPL-MCNC: 8 MG/DL (ref 8–27)
BUN/CREAT SERPL: 10 (ref 10–24)
CALCIUM SERPL-MCNC: 9.3 MG/DL (ref 8.6–10.2)
CHLORIDE SERPL-SCNC: 104 MMOL/L (ref 96–106)
CHOLEST SERPL-MCNC: 158 MG/DL (ref 100–199)
CO2 SERPL-SCNC: 24 MMOL/L (ref 20–29)
CREAT SERPL-MCNC: 0.84 MG/DL (ref 0.76–1.27)
EST. AVERAGE GLUCOSE BLD GHB EST-MCNC: 114 MG/DL
GLOBULIN SER CALC-MCNC: 3 G/DL (ref 1.5–4.5)
GLUCOSE SERPL-MCNC: 68 MG/DL (ref 65–99)
HBA1C MFR BLD: 5.6 % (ref 4.8–5.6)
HDLC SERPL-MCNC: 66 MG/DL
LDLC SERPL CALC-MCNC: 80 MG/DL (ref 0–99)
POTASSIUM SERPL-SCNC: 4.5 MMOL/L (ref 3.5–5.2)
PROT SERPL-MCNC: 6.9 G/DL (ref 6–8.5)
SODIUM SERPL-SCNC: 142 MMOL/L (ref 134–144)
TRIGL SERPL-MCNC: 59 MG/DL (ref 0–149)
VLDLC SERPL CALC-MCNC: 12 MG/DL (ref 5–40)

## 2019-04-18 NOTE — TELEPHONE ENCOUNTER
His cxr was normal aside from degenerative changes they could see in the spine. I will send him a letter with his blood test results.

## 2019-05-08 ENCOUNTER — TELEPHONE (OUTPATIENT)
Dept: INTERNAL MEDICINE CLINIC | Age: 69
End: 2019-05-08

## 2019-05-08 NOTE — TELEPHONE ENCOUNTER
Myriam Kimball came in today stating that they need a letter stating that the pt is disabled. Any questions please call [phone]. She stated please state his illnesses and everything.

## 2019-05-10 DIAGNOSIS — M54.42 CHRONIC BILATERAL LOW BACK PAIN WITH BILATERAL SCIATICA: Primary | ICD-10-CM

## 2019-05-10 DIAGNOSIS — M54.41 CHRONIC BILATERAL LOW BACK PAIN WITH BILATERAL SCIATICA: Primary | ICD-10-CM

## 2019-05-10 DIAGNOSIS — G89.29 CHRONIC BILATERAL LOW BACK PAIN WITH BILATERAL SCIATICA: Primary | ICD-10-CM

## 2019-05-10 NOTE — TELEPHONE ENCOUNTER
I printed a letter stating his medical conditions. If there is paperwork, he would need a visit and I don't specifically determine if someone is disabled.

## 2019-05-23 ENCOUNTER — TELEPHONE (OUTPATIENT)
Dept: INTERNAL MEDICINE CLINIC | Age: 69
End: 2019-05-23

## 2019-05-23 NOTE — TELEPHONE ENCOUNTER
Yes can you please call pt and schedule an appt. The cause of his weight loss may be more than his diabetes.      Thanks

## 2019-05-23 NOTE — TELEPHONE ENCOUNTER
Pt's daughter is calling, states the pt is rapidly losing weight so she is concerned and wants to know if his diabetic meds should be adjusted. She says his current weight is 132.2 She is aware he may need an appt. Please advise.

## 2019-05-24 ENCOUNTER — OFFICE VISIT (OUTPATIENT)
Dept: INTERNAL MEDICINE CLINIC | Age: 69
End: 2019-05-24

## 2019-05-24 VITALS
RESPIRATION RATE: 18 BRPM | DIASTOLIC BLOOD PRESSURE: 76 MMHG | TEMPERATURE: 98.5 F | HEIGHT: 64 IN | BODY MASS INDEX: 21.85 KG/M2 | HEART RATE: 88 BPM | WEIGHT: 128 LBS | OXYGEN SATURATION: 95 % | SYSTOLIC BLOOD PRESSURE: 110 MMHG

## 2019-05-24 DIAGNOSIS — Z12.11 SCREENING FOR MALIGNANT NEOPLASM OF COLON: ICD-10-CM

## 2019-05-24 DIAGNOSIS — R63.4 WEIGHT LOSS: Primary | ICD-10-CM

## 2019-05-24 DIAGNOSIS — R79.89 LOW TSH LEVEL: ICD-10-CM

## 2019-05-24 DIAGNOSIS — N52.9 ERECTILE DYSFUNCTION, UNSPECIFIED ERECTILE DYSFUNCTION TYPE: ICD-10-CM

## 2019-05-24 DIAGNOSIS — R94.6 ABNORMAL RESULTS OF THYROID FUNCTION STUDIES: ICD-10-CM

## 2019-05-24 RX ORDER — SILDENAFIL 100 MG/1
TABLET, FILM COATED ORAL
Qty: 6 TAB | Refills: 3 | Status: SHIPPED | OUTPATIENT
Start: 2019-05-24

## 2019-05-24 RX ORDER — METFORMIN HYDROCHLORIDE 500 MG/1
500 TABLET, EXTENDED RELEASE ORAL
Qty: 30 TAB | Refills: 4 | Status: SHIPPED | OUTPATIENT
Start: 2019-05-24 | End: 2019-08-28 | Stop reason: SDUPTHER

## 2019-05-24 NOTE — PATIENT INSTRUCTIONS
Abnormal Weight Loss: Care Instructions  Your Care Instructions    There are two types of weight loss--normal and abnormal. The normal kind happens when you are trying to lose weight by exercising more or eating less. The abnormal kind happens when you are not trying to lose weight. Many medical problems can cause abnormal weight loss. These include problems with your thyroid gland, long-term infections, mouth or throat problems that make it hard to eat, and digestive problems. They also include depression and cancer. Some medicines also may cause you to lose weight. You can work with your doctor to find the cause of your weight loss. You will probably need tests to do this. Follow-up care is a key part of your treatment and safety. Be sure to make and go to all appointments, and call your doctor if you are having problems. It's also a good idea to know your test results and keep a list of the medicines you take. How can you care for yourself at home? · Weigh yourself at the same time every day. It's best to do it first thing in the morning after you empty your bladder. Be sure to always wear the same amount of clothing. · Write down any changes in your weight and the possible causes. Discuss these with your doctor. · Your doctor may want you to change your diet. Do your best to follow his or her advice. · Ask your doctor if you should see a dietitian. This is a person who can help you plan meals that work best for your lifestyle. · Note any changes in bowel habits. These may include changes in how often you have a bowel movement. Other changes include the color and size of your stools and how solid they are. · If you are prescribed medicines, take them exactly as prescribed. Call your doctor if you think you are having a problem with your medicine. You will get more details on the specific medicines your doctor prescribes. When should you call for help?   Watch closely for changes in your health, and be sure to contact your doctor if:    · You do not get better as expected.     · You continue to lose weight. Where can you learn more? Go to http://maribel-fernando.info/. Enter A790 in the search box to learn more about \"Abnormal Weight Loss: Care Instructions. \"  Current as of: June 25, 2018  Content Version: 11.9  © 2480-7324 [employer]. Care instructions adapted under license by [employer] (which disclaims liability or warranty for this information). If you have questions about a medical condition or this instruction, always ask your healthcare professional. [place] [address] any warranty or liability for your use of this information.

## 2019-05-24 NOTE — PROGRESS NOTES
Chief Complaint   Patient presents with    Weight Loss     pt states he is unable to gain weight      1. Have you been to the ER, urgent care clinic since your last visit? Hospitalized since your last visit? No    2. Have you seen or consulted any other health care providers outside of the [address] [place] [address] since your last visit? Include any pap smears or colon screening.  No

## 2019-05-24 NOTE — PROGRESS NOTES
HISTORY OF PRESENT ILLNESS  Jaime Burrell is a 80 y.o. male. HPI Mr. Cara Mata is here with his daughter to discuss weight loss. He thinks in the past several years he has lost 40lbs. He thinks his weight loss started in [id number]. He reports having had pnemonia on and off since 2010. He was diagnosed with diabetes in 2015/16. Lab Results   Component Value Date/Time    Hemoglobin A1c 5.6 04/17/2019 02:12 AM    Hemoglobin A1c (POC) 8.8 08/22/2016 04:40 PM    Hemoglobin A1c, External 5.9 09/20/2018   with last A1c being low, I am going to reduce his 1000mg bid metformin to metformin er 500 once daily since this could contribute to his weight loss. His TSH was low when checked last year. Referral was done to endo, but he didn't go. Discussed underlying issues for weight loss - GI - has not had a colonoscopy,hyper- thyroid, metformin. He states he has a good appetite and eats well. He denies stomach pain. He states he never picked up viagra. He is not sure if the pharmacy filled it. I reprinted a prescription and advised he check goodrx or check for least expensive pharmacy. Review of Systems   Constitutional: Positive for weight loss (stable here since 11/2018). Negative for chills, fever and malaise/fatigue. Respiratory: Positive for cough. Cardiovascular: Negative for chest pain. Gastrointestinal: Negative for blood in stool, diarrhea, heartburn, nausea and vomiting. Musculoskeletal: Positive for back pain and neck pain. Psychiatric/Behavioral: Negative for depression. Physical Exam   Constitutional: He is oriented to person, place, and time. He appears well-developed and well-nourished. No distress. HENT:   Head: Normocephalic and atraumatic. Cardiovascular: Normal rate and regular rhythm. Pulmonary/Chest: Effort normal. He has no decreased breath sounds. Neurological: He is alert and oriented to person, place, and time.      Visit Vitals  /76 (BP 1 Location: Left arm, BP Patient Position: Sitting)   Pulse 88   Temp 98.5 °F (36.9 °C) (Oral)   Resp 18   Ht 5' 4\" (1.626 m)   Wt 128 lb (58.1 kg)   SpO2 95%   BMI 21.97 kg/m²     Wt Readings from Last 3 Encounters:   05/24/19 128 lb (58.1 kg)   04/17/19 127 lb (57.6 kg)   11/21/18 130 lb (59 kg)       ASSESSMENT and PLAN    ICD-10-CM ICD-9-CM    1. Weight loss R63.4 783.21    2. Abnormal results of thyroid function studies  R94.6 794.5 TSH 3RD GENERATION      T4, FREE   3. Low TSH level R79.89 794.5 TSH 3RD GENERATION      T4, FREE      COLLECTION VENOUS BLOOD,VENIPUNCTURE   4. Erectile dysfunction, unspecified erectile dysfunction type N52.9 607.84 sildenafil citrate (VIAGRA) 100 mg tablet   5. Screening for malignant neoplasm of colon Z12.11 V76.51 REFERRAL TO GASTROENTEROLOGY     Pt verbalized understanding of their condition and diagnoses, treatment plan,  as well as side effects of any new medications prescribed.

## 2019-05-28 DIAGNOSIS — E11.9 TYPE 2 DIABETES MELLITUS WITHOUT COMPLICATION, WITHOUT LONG-TERM CURRENT USE OF INSULIN (HCC): ICD-10-CM

## 2019-05-28 LAB
SPECIMEN STATUS REPORT, ROLRST: NORMAL
T4 FREE SERPL-MCNC: 1.19 NG/DL (ref 0.82–1.77)
TSH SERPL DL<=0.005 MIU/L-ACNC: 0.43 UIU/ML (ref 0.45–4.5)

## 2019-05-28 RX ORDER — SITAGLIPTIN 100 MG/1
TABLET, FILM COATED ORAL
Qty: 90 TAB | Refills: 1 | Status: SHIPPED | OUTPATIENT
Start: 2019-05-28

## 2019-05-28 NOTE — TELEPHONE ENCOUNTER
Can you see if he is still taking this? I know I reduced his metformin, but just checking if he is still taking the januvia 100mg.

## 2019-05-31 RX ORDER — SIMVASTATIN 20 MG/1
20 TABLET, FILM COATED ORAL DAILY
Qty: 90 TAB | Refills: 1 | Status: SHIPPED | OUTPATIENT
Start: 2019-05-31 | End: 2019-11-27 | Stop reason: SDUPTHER

## 2019-06-14 RX ORDER — LOSARTAN POTASSIUM 25 MG/1
TABLET ORAL
Qty: 90 TAB | Refills: 1 | Status: SHIPPED | OUTPATIENT
Start: 2019-06-14 | End: 2019-12-12 | Stop reason: SDUPTHER

## 2019-07-01 ENCOUNTER — OFFICE VISIT (OUTPATIENT)
Dept: INTERNAL MEDICINE CLINIC | Age: 69
End: 2019-07-01

## 2019-07-01 VITALS
BODY MASS INDEX: 22.36 KG/M2 | TEMPERATURE: 98.1 F | HEIGHT: 64 IN | HEART RATE: 84 BPM | DIASTOLIC BLOOD PRESSURE: 84 MMHG | SYSTOLIC BLOOD PRESSURE: 135 MMHG | OXYGEN SATURATION: 99 % | RESPIRATION RATE: 18 BRPM | WEIGHT: 131 LBS

## 2019-07-01 DIAGNOSIS — J43.8 OTHER EMPHYSEMA (HCC): ICD-10-CM

## 2019-07-01 DIAGNOSIS — M48.02 CERVICAL STENOSIS OF SPINE: ICD-10-CM

## 2019-07-01 DIAGNOSIS — Z01.818 PRE-OP EXAMINATION: ICD-10-CM

## 2019-07-01 DIAGNOSIS — G89.29 CHRONIC NECK PAIN: Primary | ICD-10-CM

## 2019-07-01 DIAGNOSIS — M54.2 CHRONIC NECK PAIN: Primary | ICD-10-CM

## 2019-07-01 RX ORDER — IPRATROPIUM BROMIDE AND ALBUTEROL SULFATE 2.5; .5 MG/3ML; MG/3ML
3 SOLUTION RESPIRATORY (INHALATION)
Qty: 24 NEBULE | Refills: 5 | Status: SHIPPED | OUTPATIENT
Start: 2019-07-01

## 2019-07-01 RX ORDER — NABUMETONE 500 MG/1
TABLET, FILM COATED ORAL 2 TIMES DAILY
COMMUNITY

## 2019-07-01 NOTE — PROGRESS NOTES
Chief Complaint   Patient presents with    Pre-op Exam     1. Have you been to the ER, urgent care clinic since your last visit? Hospitalized since your last visit? No    2. Have you seen or consulted any other health care providers outside of the [address] [place] [address] since your last visit? Include any pap smears or colon screening.  No

## 2019-07-01 NOTE — PATIENT INSTRUCTIONS
Learning About COPD  What is COPD? COPD is a lung disease that makes it hard to breathe. COPD stands for chronic obstructive pulmonary disease. It is caused by damage to the lungs over many years, usually from smoking. COPD is a mix of two diseases: chronic bronchitis and emphysema. Other things that may put you at risk for COPD include breathing chemical fumes, dust, or air pollution over a long period of time. Secondhand smoke is also bad. In chronic bronchitis, the airways that carry air to the lungs (bronchial tubes) get inflamed and make a lot of mucus. This can narrow or block the airways, making it hard for you to breathe. In emphysema, the air sacs in your lungs are damaged and lose their stretch. Less air gets in and out of your lungs, which makes you feel short of breath. What can you expect when you have COPD? COPD gets worse over time. You cannot undo the damage to your lungs. Over time, you may find that:  · You get short of breath even when you do simple things like get dressed or fix a meal.  · It is hard to eat or exercise. · You lose weight and feel weaker. But there are things you can do to prevent more damage and feel better. What are the symptoms? The main symptoms are:  · A cough that will not go away. · Mucus that comes up when you cough. · Shortness of breath that gets worse with activity. At times, your symptoms may suddenly flare up and get much worse. This is a called a COPD exacerbation (say \"egg-DAVID--BAY-marc\"). When this happens, your usual symptoms quickly get worse and stay bad. This can be dangerous. You may have to go to the hospital.  How can you keep COPD from getting worse? Don't smoke. That is the best way to keep COPD from getting worse. If you already smoke, it is never too late to stop. If you need help quitting, talk to your doctor about stop-smoking programs and medicines. These can increase your chances of quitting for good.   You can do other things to keep COPD from getting worse:  · Avoid bad air. Air pollution, chemical fumes, and dust also can make COPD worse. · Get a flu shot every year. A shot may keep the flu from turning into something more serious, like pneumonia. A flu shot also may lower your chances of having a COPD flare-up. · Get a pneumococcal vaccine shot. If you have had one before, ask your doctor if you need another dose. How is COPD treated? COPD is treated with medicines and oxygen. You also can take steps at home to stay healthy and keep your COPD from getting worse. Medicines and oxygen therapy  · You may be taking medicines such as:  ? Bronchodilators. These help open your airways and make breathing easier. Bronchodilators are either short-acting (work for 6 to 9 hours) or long-acting (work for 24 hours). You inhale most bronchodilators, so they start to act quickly. Always carry your quick-relief inhaler with you in case you need it while you are away from home. ? Corticosteroids. These reduce airway inflammation. They come in pill or inhaled form. You must take these medicines every day for them to work well. · Take your medicines exactly as prescribed. Call your doctor if you think you are having a problem with your medicine. · Oxygen therapy boosts the amount of oxygen in your blood and helps you breathe easier. Use the flow rate your doctor has recommended, and do not change it without talking to your doctor first.  Other care at home  · If your doctor recommends it, get more exercise. Walking is a good choice. Bit by bit, increase the amount you walk every day. Try for at least 30 minutes on most days of the week. · Learn breathing methods--such as breathing through pursed lips--to help you become less short of breath. · If your doctor has not set you up with a pulmonary rehabilitation program, talk to him or her about whether rehab is right for you.  Rehab includes exercise programs, education about your disease and how to manage it, help with diet and other changes, and emotional support. · Eat regular, healthy meals. Use bronchodilators about 1 hour before you eat to make it easier to eat. Eat several small meals instead of three large ones. Drink beverages at the end of the meal. Avoid foods that are hard to chew. Follow-up care is a key part of your treatment and safety. Be sure to make and go to all appointments, and call your doctor if you are having problems. It's also a good idea to know your test results and keep a list of the medicines you take. Where can you learn more? Go to http://maribel-fernando.info/. Enter V314 in the search box to learn more about \"Learning About COPD. \"  Current as of: September 5, 2018  Content Version: 11.9  © 3967-3986 [employer]. Care instructions adapted under license by [employer] (which disclaims liability or warranty for this information). If you have questions about a medical condition or this instruction, always ask your healthcare professional. [address] any warranty or liability for your use of this information.

## 2019-07-01 NOTE — PROGRESS NOTES
HISTORY OF PRESENT ILLNESS  Radha Neal is a 80 y.o. male. HPI Mr. Kwame Verdugo is here for pre-op clearance for Right C 4&5 corpectomy with C3-6 anterior fusion. He has not benefitted from conservative treatment for chronic neck pain. He has not had his pre-op testing completed for me to review and is aware he needs to go as soon as possible. He has had cardiac clearance prior to hernia surgery done earlier this year. Looking through records, I think he underwent a pulmonary work-up and had been seen in the hospital for weakness. Cardiac enzymes and EKG were done at the time. He reports his weight loss has stablized since reducing metformin. He is requesting a refill on duoneb neb solution   Past Medical History:   Diagnosis Date    Aortic valve insufficiency     Aortic valve sclerosis     Arthropathy of lumbar facet joint     Cervical spondylosis     Chronic low back pain     Chronic neck pain     Chronic obstructive pulmonary disease (HCC)     Diabetes ([facility] [address]     Gout     Head trauma 1986    HTN (hypertension)     Hypercholesterolemia     Shingles      Family History   Problem Relation Age of Onset    Diabetes Mother     Cancer Mother     Other Father         natural causes   No Known Allergies  Current Outpatient Medications   Medication Sig    nabumetone (RELAFEN) 500 mg tablet Take  by mouth two (2) times a day.  albuterol-ipratropium (DUO-NEB) 2.5 mg-0.5 mg/3 ml nebu 3 mL by Nebulization route every four (4) hours as needed (wheezing, chest tightness).  JANUVIA 100 mg tablet TAKE 1 TABLET BY MOUTH EVERY DAY    metFORMIN ER (GLUCOPHAGE XR) 500 mg tablet Take 1 Tab by mouth daily (with dinner).  Discontinue metformin 1000 twice a day    sildenafil citrate (VIAGRA) 100 mg tablet Take 1 tab once daily prior to sexual activity    VENTOLIN HFA 90 mcg/actuation inhaler INHALE 1-2 PUFFS BY MOUTH EVERY 4 HOURS AS NEEDED FOR WHEEZING OR SHORTNESS OF BREATH    allopurinol (ZYLOPRIM) 100 mg tablet Take 1 Tab by mouth daily. Indications: treatment to prevent acute gout attack    tiotropium (SPIRIVA WITH HANDIHALER) 18 mcg inhalation capsule TAKE 1 CAP BY INHALATION DAILY.  fluticasone propion-salmeterol (ADVAIR DISKUS) 250-50 mcg/dose diskus inhaler TAKE 1 PUFF BY MOUTH TWICE A DAY    loratadine (CLARITIN) 10 mg tablet Take 1 Tab by mouth daily. For allergies    docusate sodium (COLACE) 100 mg capsule TAKE 1 CAPSULE BY MOUTH EVERY DAY    Blood-Glucose Meter (ACCU-CHEK HUMBERTO PLUS METER) misc Check glucose BID    glucose blood VI test strips (ACCU-CHEK HUMBERTO PLUS TEST STRP) strip Check glucose BID    Lancets (ACCU-CHEK MULTICLIX LANCET) misc Test glucose BID    ammonium lactate (LAC-HYDRIN) 12 % lotion rub in to affected area well 4-5 times per day    albuterol (PROVENTIL VENTOLIN) 2.5 mg /3 mL (0.083 %) nebulizer solution INHALE 1 VIAL (3ML) VIA NEBULIZER TWICE DAILY    losartan (COZAAR) 25 mg tablet TAKE 1 TABLET BY MOUTH ONCE A DAY.  simvastatin (ZOCOR) 20 mg tablet Take 1 Tab by mouth daily.  methylPREDNISolone (MEDROL, LADONNA,) 4 mg tablet Use as directed    topiramate (TOPAMAX) 25 mg tablet     sulindac (CLINORIL) 200 mg tablet Take 1 Tab by mouth two (2) times a day. With food for pain    gabapentin (NEURONTIN) 100 mg capsule 100 mg. No current facility-administered medications for this visit. Review of Systems   Constitutional: Negative. HENT: Negative. Eyes: Negative. Respiratory: Positive for cough (occasional). Cardiovascular: Negative. Gastrointestinal: Negative. Genitourinary: Negative. Musculoskeletal: Positive for back pain and neck pain. Neurological: Positive for tingling (bilateral arm radiculopathy). Psychiatric/Behavioral: Negative. Physical Exam   Constitutional: He is oriented to person, place, and time. He appears well-developed and well-nourished. No distress. HENT:   Head: Normocephalic and atraumatic.    Eyes: Conjunctivae are normal.   Cardiovascular: Normal rate and regular rhythm. No murmur heard. Pulmonary/Chest: Effort normal. He has decreased breath sounds. He has no wheezes. Musculoskeletal: He exhibits no edema. Neurological: He is alert and oriented to person, place, and time. Psychiatric: He has a normal mood and affect. His behavior is normal. Judgment and thought content normal.     Visit Vitals  /84 (BP 1 Location: Left arm, BP Patient Position: Sitting)   Pulse 84   Temp 98.1 °F (36.7 °C) (Oral)   Resp 18   Ht 5' 4\" (1.626 m)   Wt 131 lb (59.4 kg)   SpO2 99%   BMI 22.49 kg/m²     Wt Readings from Last 3 Encounters:   07/01/19 131 lb (59.4 kg)   05/24/19 128 lb (58.1 kg)   04/17/19 127 lb (57.6 kg)         ASSESSMENT and PLAN    ICD-10-CM ICD-9-CM    1. Chronic neck pain M54.2 723.1     G89.29 338.29    2. Other emphysema ([facility] [address] J43.8 492.8 albuterol-ipratropium (DUO-NEB) 2.5 mg-0.5 mg/3 ml nebu   3. Cervical stenosis of spine M48.02 723.0    4. Pre-op examination Z01.818 V72.84      Clearance pending results of pre-op labs and testing. He is cleared for surgery. EKG showed no change from prior. CXR : WNL  Glucose 181 - pt is diabetic. A1c is 5.6. CBC: WNL    Discussed risks of smoking and adverse effects on health. Encourage smoking cessation. Total time spent with pt 25min, greater than 50% which was spent counseling and coordinating care. Pt verbalized understanding of their condition and diagnoses, treatment plan,  as well as side effects of any new medications prescribed.

## 2019-07-10 ENCOUNTER — TELEPHONE (OUTPATIENT)
Dept: INTERNAL MEDICINE CLINIC | Age: 69
End: 2019-07-10

## 2019-07-10 NOTE — TELEPHONE ENCOUNTER
Daughter Marilyn Ramirez called in stating her father had surgery and she is requesting an order to get a hospital bed for pt. Please advise.

## 2019-07-12 NOTE — TELEPHONE ENCOUNTER
Usually when the Pt is discharged from the hospital after a surgery if the surgeon or discharge planner feels he needs a hospital bed they will order one. They may not feel he needs one. He needs to Check with the surgeons office.

## 2019-08-28 RX ORDER — METFORMIN HYDROCHLORIDE 500 MG/1
500 TABLET, EXTENDED RELEASE ORAL
Qty: 30 TAB | Refills: 1 | Status: SHIPPED | OUTPATIENT
Start: 2019-08-28 | End: 2019-10-05 | Stop reason: SDUPTHER

## 2019-11-12 ENCOUNTER — OFFICE VISIT (OUTPATIENT)
Dept: INTERNAL MEDICINE CLINIC | Age: 69
End: 2019-11-12

## 2019-11-12 VITALS
WEIGHT: 132 LBS | SYSTOLIC BLOOD PRESSURE: 107 MMHG | DIASTOLIC BLOOD PRESSURE: 70 MMHG | RESPIRATION RATE: 18 BRPM | HEART RATE: 88 BPM | HEIGHT: 64 IN | TEMPERATURE: 98.1 F | BODY MASS INDEX: 22.53 KG/M2 | OXYGEN SATURATION: 98 %

## 2019-11-12 DIAGNOSIS — M54.2 NECK PAIN: Primary | ICD-10-CM

## 2019-11-12 DIAGNOSIS — R79.89 LOW TSH LEVEL: ICD-10-CM

## 2019-11-12 DIAGNOSIS — J44.9 CHRONIC OBSTRUCTIVE PULMONARY DISEASE, UNSPECIFIED COPD TYPE (HCC): ICD-10-CM

## 2019-11-12 RX ORDER — LIDOCAINE 50 MG/G
PATCH TOPICAL
Qty: 90 EACH | Refills: 1 | Status: SHIPPED | OUTPATIENT
Start: 2019-11-12

## 2019-11-12 RX ORDER — CETIRIZINE HCL 10 MG
10 TABLET ORAL
Qty: 90 TAB | Refills: 3 | Status: SHIPPED | OUTPATIENT
Start: 2019-11-12

## 2019-11-12 NOTE — PROGRESS NOTES
HISTORY OF PRESENT ILLNESS  Roverto Elaine is a 80 y.o. male. HPI Mr. Jodi Sexton is here for follow up after being seen at the ER for neck pain and breathing issues. He is s/p cervical fusion and has not gone to PT as he was advised. He feels that trelegy is working the best for him and without it, his breathing worsens. He never had seen endocrinology for a repeatedly low TSH. I had referred him now over a year ago. Review of Systems   Constitutional: Negative. HENT: Negative. Eyes: Negative. Respiratory: Positive for cough and shortness of breath (chronic). Gastrointestinal: Negative. Musculoskeletal: Positive for neck pain. Neurological: Negative. Psychiatric/Behavioral: Negative. Physical Exam   Constitutional: He appears well-developed and well-nourished. No distress. HENT:   Head: Normocephalic and atraumatic. Eyes: Conjunctivae are normal.   Cardiovascular: Normal rate and regular rhythm. No murmur heard. Pulmonary/Chest: Effort normal. He has decreased breath sounds. Musculoskeletal: He exhibits no edema. Neurological: He is alert. Visit Vitals  /70 (BP 1 Location: Left arm, BP Patient Position: Sitting)   Pulse 88   Temp 98.1 °F (36.7 °C) (Oral)   Resp 18   Ht 5' 4\" (1.626 m)   Wt 132 lb (59.9 kg)   SpO2 98%   BMI 22.66 kg/m²     Wt Readings from Last 3 Encounters:   11/12/19 132 lb (59.9 kg)   07/01/19 131 lb (59.4 kg)   05/24/19 128 lb (58.1 kg)       ASSESSMENT and PLAN    ICD-10-CM ICD-9-CM    1. Neck pain M54.2 723.1 Advised to follow up with PT as ordered by the surgeon. Lidoderm patch prn.    2. 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 Low TSH level R79.89 794.5 REFERRAL TO ENDOCRINOLOGY     Pt verbalized understanding of their condition and diagnoses, treatment plan,  as well as side effects of any new medications prescribed.

## 2019-11-12 NOTE — PROGRESS NOTES
Chief Complaint   Patient presents with   [facility] Follow Up     1. Have you been to the ER, urgent care clinic since your last visit? Hospitalized since your last visit? Yes Where: [facility]    2. Have you seen or consulted any other health care providers outside of the [address] [place] [address] since your last visit? Include any pap smears or colon screening.  No

## 2019-11-14 NOTE — PATIENT INSTRUCTIONS
Chronic Obstructive Pulmonary Disease (COPD): Care Instructions  Your Care Instructions    Chronic obstructive pulmonary disease (COPD) is a general term for a group of lung diseases, including emphysema and chronic bronchitis. People with COPD have decreased airflow in and out of the lungs, which makes it hard to breathe. The airways also can get clogged with thick mucus. Cigarette smoking is a major cause of COPD. Although there is no cure for COPD, you can slow its progress. Following your treatment plan and taking care of yourself can help you feel better and live longer. Follow-up care is a key part of your treatment and safety. Be sure to make and go to all appointments, and call your doctor if you are having problems. It's also a good idea to know your test results and keep a list of the medicines you take. How can you care for yourself at home?   Staying healthy    · Do not smoke. This is the most important step you can take to prevent more damage to your lungs. If you need help quitting, talk to your doctor about stop-smoking programs and medicines. These can increase your chances of quitting for good.     · Avoid colds and flu. Get a pneumococcal vaccine shot. If you have had one before, ask your doctor whether you need a second dose. Get the flu vaccine every fall. If you must be around people with colds or the flu, wash your hands often.     · Avoid secondhand smoke, air pollution, and high altitudes. Also avoid cold, dry air and hot, humid air. Stay at home with your windows closed when air pollution is bad.    Medicines and oxygen therapy    · Take your medicines exactly as prescribed. Call your doctor if you think you are having a problem with your medicine.     · You may be taking medicines such as:  ? Bronchodilators. These help open your airways and make breathing easier. Bronchodilators are either short-acting (work for 6 to 9 hours) or long-acting (work for 24 hours).  You inhale most bronchodilators, so they start to act quickly. Always carry your quick-relief inhaler with you in case you need it while you are away from home. ? Corticosteroids (prednisone, budesonide). These reduce airway inflammation. They come in pill or inhaled form. You must take these medicines every day for them to work well.     · A spacer may help you get more inhaled medicine to your lungs. Ask your doctor or pharmacist if a spacer is right for you. If it is, ask how to use it properly.     · Do not take any vitamins, over-the-counter medicine, or herbal products without talking to your doctor first.     · If your doctor prescribed antibiotics, take them as directed. Do not stop taking them just because you feel better. You need to take the full course of antibiotics.     · Oxygen therapy boosts the amount of oxygen in your blood and helps you breathe easier. Use the flow rate your doctor has recommended, and do not change it without talking to your doctor first.   Activity    · Get regular exercise. Walking is an easy way to get exercise. Start out slowly, and walk a little more each day.     · Pay attention to your breathing. You are exercising too hard if you cannot talk while you are exercising.     · Take short rest breaks when doing household chores and other activities.     · Learn breathing methods--such as breathing through pursed lips--to help you become less short of breath.     · If your doctor has not set you up with a pulmonary rehabilitation program, talk to him or her about whether rehab is right for you. Rehab includes exercise programs, education about your disease and how to manage it, help with diet and other changes, and emotional support. Diet    · Eat regular, healthy meals. Use bronchodilators about 1 hour before you eat to make it easier to eat. Eat several small meals instead of three large ones.  Drink beverages at the end of the meal. Avoid foods that are hard to chew.     · Eat foods that contain protein so that you do not lose muscle mass.     · Talk with your doctor if you gain too much weight or if you lose weight without trying.    Mental health    · Talk to your family, friends, or a therapist about your feelings. It is normal to feel frightened, angry, hopeless, helpless, and even guilty. Talking openly about bad feelings can help you cope. If these feelings last, talk to your doctor. When should you call for help? Call 911 anytime you think you may need emergency care. For example, call if:    · You have severe trouble breathing.    Call your doctor now or seek immediate medical care if:    · You have new or worse trouble breathing.     · You cough up blood.     · You have a fever.    Watch closely for changes in your health, and be sure to contact your doctor if:    · You cough more deeply or more often, especially if you notice more mucus or a change in the color of your mucus.     · You have new or worse swelling in your legs or belly.     · You are not getting better as expected. Where can you learn more? Go to http://maribel-fernando.info/. Imelda Monaco in the search box to learn more about \"Chronic Obstructive Pulmonary Disease (COPD): Care Instructions. \"  Current as of: June 9, 2019  Content Version: 12.2  © 3050-5920 [employer]. Care instructions adapted under license by [employer] (which disclaims liability or warranty for this information). If you have questions about a medical condition or this instruction, always ask your healthcare professional. [place] [address] any warranty or liability for your use of this information.

## 2019-12-12 RX ORDER — LOSARTAN POTASSIUM 25 MG/1
TABLET ORAL
Qty: 90 TAB | Refills: 1 | Status: SHIPPED | OUTPATIENT
Start: 2019-12-12

## 2019-12-19 RX ORDER — SIMVASTATIN 20 MG/1
TABLET, FILM COATED ORAL
Qty: 90 TAB | Refills: 0 | Status: SHIPPED | OUTPATIENT
Start: 2019-12-19

## 2020-09-20 NOTE — PROGRESS NOTES
.  Patient has graduated from the Transitions of Care Coordination  program on 11/29/208  Patient came to office visit on 11/21/2018 Gout. Patient's symptoms are stable at this time. Patient/family has the ability to self-manage. Care management goals have been completed at this time. No further nurse navigator follow up scheduled. Goals Addressed                 This Visit's Progress       Post Hospitalization     COMPLETED: Attends follow-up appointments as directed.  COMPLETED: Knowledge and adherence of prescribed medication (ie. action, side effects, missed dose, etc.).  COMPLETED: Prevent complications post hospitalization. Planned admission for hernia surgery 11/13/2018       COMPLETED: Returns to baseline activity level. F: 2 L NS in ED; continue maintenance NS @ 150 mL/hour  E: Replete K <4; Mg <2  N: Clear Liquid, advance as tolerated    DVT Ppx: SCD  Gi Ppx: None Chart review mentions history of pre-diabetes; however, no current A1c   - F/U A1c in AM and advise accordingly Patient reports MIKE with iron infusions monthly, last infusion 2 weeks ago  - Continue to monitor with daily CBC  - Currently asymptomatic s/p 2 L NS in ED; now on maintenance NS @ 150 mL/hour  - Continue to encourage PO intake as tolerated  - Clear liquid diet; advance as tolerated

## 2024-06-04 NOTE — PROGRESS NOTES
HISTORY OF PRESENT ILLNESS Natalie Bauer is a 78 y.o. male. HPI  Mr. Kelvin Dimas is here for a pre-op physical for left inguinal hernia repair. He has been to the ER several times for this. Reviewing his pre-op labs. His urine shows bacteria. His TSH is low and his EKG is abnormal.  
 
 
Review of Systems Constitutional: Negative. HENT: Negative. Eyes: Negative. Respiratory: Positive for shortness of breath (CXR clear, has been previously diagnosed with emphysema). Negative for cough. Cardiovascular: Negative for chest pain and leg swelling. Gastrointestinal:  
     Left inguinal pain/swelling Genitourinary: Negative for dysuria and urgency. Neurological: Negative for tingling. Physical Exam  
Constitutional: He is oriented to person, place, and time. He appears well-developed and well-nourished. No distress. HENT:  
Head: Normocephalic. Eyes: Conjunctivae are normal.  
Neck: Normal range of motion. Neck supple. Cardiovascular: Normal rate and regular rhythm. Pulmonary/Chest: Effort normal. He has no wheezes. Abdominal: Soft.  
+inguinal hernia, left inguinal region Musculoskeletal: He exhibits no edema. Neurological: He is alert and oriented to person, place, and time. Visit Vitals  /80 (BP 1 Location: Left arm, BP Patient Position: Sitting)  Pulse 60  Temp 98 °F (36.7 °C) (Oral)  Resp 18  Ht 5' 4\" (1.626 m)  Wt 130 lb (59 kg)  SpO2 99%  BMI 22.31 kg/m2 Wt Readings from Last 3 Encounters:  
09/21/18 130 lb (59 kg) 08/27/18 130 lb (59 kg) 08/22/16 139 lb (63 kg) Diabetic foot exam:  
 
Left: Filament test normal sensation with micro filament Pulse DP: 2+ (normal) Pulse PT: 2+ (normal) Deformities: None Right: Filament test normal sensation with micro filament Pulse DP: 2+ (normal) Pulse PT: 2+ (normal) Deformities: None His skin is very dry on his feet and lower legs. His toenails are thickened and overgrown. ASSESSMENT and PLAN 
  ICD-10-CM ICD-9-CM 1. Left inguinal hernia K40.90 550.90 2. Pre-op exam Z01.818 V72.84 Not cleared at this time. 3. Leukocytes in urine R82.99 791.7 CULTURE, URINE  
   CT/NG/T.VAGINALIS AMPLIFICATION 4. Encounter for immunization Z23 V03.89   
5. Type 2 diabetes mellitus without complication, without long-term current use of insulin (HCC) E11.9 250.00 LIPID PANEL  
   HM DIABETES FOOT EXAM  
   AMB POC URINE, MICROALBUMIN, SEMIQUANTITATIVE REFERRAL TO OPHTHALMOLOGY  
   TSH 3RD GENERATION  
   REFERRAL TO PODIATRY 6. Screening for malignant neoplasm of prostate Z12.5 V76.44 PSA SCREENING (SCREENING) 7. Dry skin dermatitis L85.3 692.89 REFERRAL TO PODIATRY  
   ammonium lactate (LAC-HYDRIN) 12 % lotion 8. Abnormal EKG R94.31 794.31 He was scheduled for cardiology. 10/1 3 pm. CVAL Pt verbalized understanding of their condition and diagnoses, treatment plan,  as well as side effects of any new medications prescribed. [No Known Substance Use] : no known substance use